# Patient Record
Sex: FEMALE | Race: WHITE | NOT HISPANIC OR LATINO
[De-identification: names, ages, dates, MRNs, and addresses within clinical notes are randomized per-mention and may not be internally consistent; named-entity substitution may affect disease eponyms.]

---

## 2020-07-21 ENCOUNTER — NON-APPOINTMENT (OUTPATIENT)
Age: 70
End: 2020-07-21

## 2020-07-21 ENCOUNTER — APPOINTMENT (OUTPATIENT)
Dept: CARDIOLOGY | Facility: CLINIC | Age: 70
End: 2020-07-21
Payer: MEDICARE

## 2020-07-21 VITALS
SYSTOLIC BLOOD PRESSURE: 159 MMHG | OXYGEN SATURATION: 96 % | HEIGHT: 61 IN | BODY MASS INDEX: 30.21 KG/M2 | HEART RATE: 75 BPM | WEIGHT: 160 LBS | DIASTOLIC BLOOD PRESSURE: 82 MMHG

## 2020-07-21 DIAGNOSIS — I65.29 OCCLUSION AND STENOSIS OF UNSPECIFIED CAROTID ARTERY: ICD-10-CM

## 2020-07-21 DIAGNOSIS — R03.0 ELEVATED BLOOD-PRESSURE READING, W/OUT DIAGNOSIS OF HYPERTENSION: ICD-10-CM

## 2020-07-21 DIAGNOSIS — R01.1 CARDIAC MURMUR, UNSPECIFIED: ICD-10-CM

## 2020-07-21 PROCEDURE — 93000 ELECTROCARDIOGRAM COMPLETE: CPT

## 2020-07-21 PROCEDURE — 99215 OFFICE O/P EST HI 40 MIN: CPT

## 2020-07-21 RX ORDER — PITAVASTATIN CALCIUM 4.18 MG/1
TABLET, FILM COATED ORAL
Refills: 0 | Status: ACTIVE | COMMUNITY

## 2020-07-21 RX ORDER — GLUCOSAMINE HCL 500 MG
TABLET ORAL
Refills: 0 | Status: ACTIVE | COMMUNITY

## 2020-08-17 ENCOUNTER — APPOINTMENT (OUTPATIENT)
Dept: CARDIOLOGY | Facility: CLINIC | Age: 70
End: 2020-08-17
Payer: MEDICARE

## 2020-08-17 PROCEDURE — 93790 AMBL BP MNTR W/SW I&R: CPT

## 2020-08-17 PROCEDURE — 93880 EXTRACRANIAL BILAT STUDY: CPT

## 2020-08-17 PROCEDURE — 93306 TTE W/DOPPLER COMPLETE: CPT

## 2020-09-01 ENCOUNTER — APPOINTMENT (OUTPATIENT)
Dept: CARDIOLOGY | Facility: CLINIC | Age: 70
End: 2020-09-01
Payer: MEDICARE

## 2020-09-01 PROCEDURE — 93015 CV STRESS TEST SUPVJ I&R: CPT

## 2020-09-14 ENCOUNTER — NON-APPOINTMENT (OUTPATIENT)
Age: 70
End: 2020-09-14

## 2020-09-14 ENCOUNTER — APPOINTMENT (OUTPATIENT)
Dept: CARDIOLOGY | Facility: CLINIC | Age: 70
End: 2020-09-14
Payer: MEDICARE

## 2020-09-14 VITALS
WEIGHT: 157 LBS | OXYGEN SATURATION: 98 % | DIASTOLIC BLOOD PRESSURE: 81 MMHG | BODY MASS INDEX: 29.64 KG/M2 | HEIGHT: 61 IN | SYSTOLIC BLOOD PRESSURE: 134 MMHG | HEART RATE: 68 BPM

## 2020-09-14 DIAGNOSIS — I34.0 NONRHEUMATIC MITRAL (VALVE) INSUFFICIENCY: ICD-10-CM

## 2020-09-14 DIAGNOSIS — I10 ESSENTIAL (PRIMARY) HYPERTENSION: ICD-10-CM

## 2020-09-14 DIAGNOSIS — R06.00 DYSPNEA, UNSPECIFIED: ICD-10-CM

## 2020-09-14 DIAGNOSIS — I35.1 NONRHEUMATIC AORTIC (VALVE) INSUFFICIENCY: ICD-10-CM

## 2020-09-14 PROCEDURE — 93000 ELECTROCARDIOGRAM COMPLETE: CPT

## 2020-09-14 PROCEDURE — 99215 OFFICE O/P EST HI 40 MIN: CPT

## 2020-10-19 ENCOUNTER — APPOINTMENT (OUTPATIENT)
Dept: CARDIOLOGY | Facility: CLINIC | Age: 70
End: 2020-10-19
Payer: MEDICARE

## 2020-10-19 PROCEDURE — 78452 HT MUSCLE IMAGE SPECT MULT: CPT

## 2020-10-19 PROCEDURE — 93015 CV STRESS TEST SUPVJ I&R: CPT

## 2020-10-19 PROCEDURE — A9500: CPT

## 2022-03-07 ENCOUNTER — APPOINTMENT (OUTPATIENT)
Dept: OTOLARYNGOLOGY | Facility: CLINIC | Age: 72
End: 2022-03-07
Payer: MEDICARE

## 2022-03-07 VITALS
DIASTOLIC BLOOD PRESSURE: 78 MMHG | HEART RATE: 84 BPM | HEIGHT: 61 IN | WEIGHT: 150 LBS | SYSTOLIC BLOOD PRESSURE: 157 MMHG | BODY MASS INDEX: 28.32 KG/M2

## 2022-03-07 DIAGNOSIS — K21.9 GASTRO-ESOPHAGEAL REFLUX DISEASE W/OUT ESOPHAGITIS: ICD-10-CM

## 2022-03-07 DIAGNOSIS — R13.12 DYSPHAGIA, OROPHARYNGEAL PHASE: ICD-10-CM

## 2022-03-07 DIAGNOSIS — R22.1 LOCALIZED SWELLING, MASS AND LUMP, NECK: ICD-10-CM

## 2022-03-07 PROCEDURE — 31575 DIAGNOSTIC LARYNGOSCOPY: CPT

## 2022-03-07 PROCEDURE — 99204 OFFICE O/P NEW MOD 45 MIN: CPT | Mod: 25

## 2022-03-07 RX ORDER — FLUCONAZOLE 150 MG/1
150 TABLET ORAL
Qty: 1 | Refills: 0 | Status: DISCONTINUED | COMMUNITY
Start: 2021-10-13

## 2022-03-07 RX ORDER — FLUTICASONE PROPIONATE 50 UG/1
50 SPRAY, METERED NASAL
Qty: 16 | Refills: 0 | Status: DISCONTINUED | COMMUNITY
Start: 2021-11-29

## 2022-03-07 RX ORDER — CIPROFLOXACIN HYDROCHLORIDE 250 MG/1
250 TABLET, FILM COATED ORAL
Qty: 10 | Refills: 0 | Status: DISCONTINUED | COMMUNITY
Start: 2021-10-09

## 2022-03-07 RX ORDER — CEPHALEXIN 500 MG/1
500 CAPSULE ORAL
Qty: 10 | Refills: 0 | Status: DISCONTINUED | COMMUNITY
Start: 2021-10-13

## 2022-03-07 NOTE — PHYSICAL EXAM
[] : septum deviated to the right [Normal] : mucosa is normal [Midline] : trachea located in midline position [de-identified] : prominent right sterno-clavicular joint

## 2022-03-07 NOTE — HISTORY OF PRESENT ILLNESS
[de-identified] : 71 yr old female feels a lump in her neck, noticed it in Jan.\par rare dysphagia w occ choking on water.  Much worse after dietary indiscretion\par daughter had thyroid CA, had surgery and is doing well\par stopped cigs 1975

## 2022-03-07 NOTE — REVIEW OF SYSTEMS
[Problem Snoring] : problem snoring [Snoring With Pauses] : snoring with pauses [Throat Dryness] : throat dryness [Throat Itching] : throat itching [Eyes Itch] : itching of the eyes [Palpitations] : palpitations [Heartburn] : heartburn [Negative] : Heme/Lymph [FreeTextEntry3] : watery eyes [FreeTextEntry6] : noisy breathing

## 2022-03-21 ENCOUNTER — APPOINTMENT (OUTPATIENT)
Dept: ULTRASOUND IMAGING | Facility: CLINIC | Age: 72
End: 2022-03-21
Payer: MEDICARE

## 2022-03-21 ENCOUNTER — OUTPATIENT (OUTPATIENT)
Dept: OUTPATIENT SERVICES | Facility: HOSPITAL | Age: 72
LOS: 1 days | End: 2022-03-21
Payer: MEDICARE

## 2022-03-21 DIAGNOSIS — R22.1 LOCALIZED SWELLING, MASS AND LUMP, NECK: ICD-10-CM

## 2022-03-21 PROCEDURE — 76536 US EXAM OF HEAD AND NECK: CPT

## 2022-03-21 PROCEDURE — 76536 US EXAM OF HEAD AND NECK: CPT | Mod: 26

## 2022-03-24 ENCOUNTER — RESULT REVIEW (OUTPATIENT)
Age: 72
End: 2022-03-24

## 2022-03-24 DIAGNOSIS — E04.1 NONTOXIC SINGLE THYROID NODULE: ICD-10-CM

## 2022-04-06 ENCOUNTER — RESULT REVIEW (OUTPATIENT)
Age: 72
End: 2022-04-06

## 2022-04-06 ENCOUNTER — OUTPATIENT (OUTPATIENT)
Dept: OUTPATIENT SERVICES | Facility: HOSPITAL | Age: 72
LOS: 1 days | End: 2022-04-06
Payer: MEDICARE

## 2022-04-06 ENCOUNTER — APPOINTMENT (OUTPATIENT)
Dept: ULTRASOUND IMAGING | Facility: CLINIC | Age: 72
End: 2022-04-06
Payer: MEDICARE

## 2022-04-06 DIAGNOSIS — E04.1 NONTOXIC SINGLE THYROID NODULE: ICD-10-CM

## 2022-04-06 PROCEDURE — 10005 FNA BX W/US GDN 1ST LES: CPT

## 2025-04-21 ENCOUNTER — OUTPATIENT (OUTPATIENT)
Dept: OUTPATIENT SERVICES | Facility: HOSPITAL | Age: 75
LOS: 1 days | End: 2025-04-21
Payer: MEDICARE

## 2025-04-21 VITALS
DIASTOLIC BLOOD PRESSURE: 79 MMHG | TEMPERATURE: 98 F | OXYGEN SATURATION: 100 % | HEIGHT: 59 IN | RESPIRATION RATE: 16 BRPM | HEART RATE: 88 BPM | WEIGHT: 147.93 LBS | SYSTOLIC BLOOD PRESSURE: 157 MMHG

## 2025-04-21 DIAGNOSIS — Z01.818 ENCOUNTER FOR OTHER PREPROCEDURAL EXAMINATION: ICD-10-CM

## 2025-04-21 DIAGNOSIS — K40.90 UNILATERAL INGUINAL HERNIA, WITHOUT OBSTRUCTION OR GANGRENE, NOT SPECIFIED AS RECURRENT: ICD-10-CM

## 2025-04-21 DIAGNOSIS — Z90.710 ACQUIRED ABSENCE OF BOTH CERVIX AND UTERUS: Chronic | ICD-10-CM

## 2025-04-21 DIAGNOSIS — Z98.891 HISTORY OF UTERINE SCAR FROM PREVIOUS SURGERY: Chronic | ICD-10-CM

## 2025-04-21 DIAGNOSIS — K42.9 UMBILICAL HERNIA WITHOUT OBSTRUCTION OR GANGRENE: ICD-10-CM

## 2025-04-21 DIAGNOSIS — Z98.890 OTHER SPECIFIED POSTPROCEDURAL STATES: Chronic | ICD-10-CM

## 2025-04-21 LAB
ALBUMIN SERPL ELPH-MCNC: 4.1 G/DL — SIGNIFICANT CHANGE UP (ref 3.3–5)
ALP SERPL-CCNC: 68 U/L — SIGNIFICANT CHANGE UP (ref 40–120)
ALT FLD-CCNC: 25 U/L — SIGNIFICANT CHANGE UP (ref 12–78)
ANION GAP SERPL CALC-SCNC: 7 MMOL/L — SIGNIFICANT CHANGE UP (ref 5–17)
AST SERPL-CCNC: 25 U/L — SIGNIFICANT CHANGE UP (ref 15–37)
BILIRUB SERPL-MCNC: 1 MG/DL — SIGNIFICANT CHANGE UP (ref 0.2–1.2)
BUN SERPL-MCNC: 23 MG/DL — SIGNIFICANT CHANGE UP (ref 7–23)
CALCIUM SERPL-MCNC: 9.9 MG/DL — SIGNIFICANT CHANGE UP (ref 8.5–10.1)
CHLORIDE SERPL-SCNC: 107 MMOL/L — SIGNIFICANT CHANGE UP (ref 96–108)
CO2 SERPL-SCNC: 25 MMOL/L — SIGNIFICANT CHANGE UP (ref 22–31)
CREAT SERPL-MCNC: 0.78 MG/DL — SIGNIFICANT CHANGE UP (ref 0.5–1.3)
EGFR: 80 ML/MIN/1.73M2 — SIGNIFICANT CHANGE UP
EGFR: 80 ML/MIN/1.73M2 — SIGNIFICANT CHANGE UP
GLUCOSE SERPL-MCNC: 96 MG/DL — SIGNIFICANT CHANGE UP (ref 70–99)
HCT VFR BLD CALC: 39.1 % — SIGNIFICANT CHANGE UP (ref 34.5–45)
HGB BLD-MCNC: 13.3 G/DL — SIGNIFICANT CHANGE UP (ref 11.5–15.5)
MCHC RBC-ENTMCNC: 29.2 PG — SIGNIFICANT CHANGE UP (ref 27–34)
MCHC RBC-ENTMCNC: 34 G/DL — SIGNIFICANT CHANGE UP (ref 32–36)
MCV RBC AUTO: 85.7 FL — SIGNIFICANT CHANGE UP (ref 80–100)
NRBC BLD AUTO-RTO: 0 /100 WBCS — SIGNIFICANT CHANGE UP (ref 0–0)
PLATELET # BLD AUTO: 260 K/UL — SIGNIFICANT CHANGE UP (ref 150–400)
POTASSIUM SERPL-MCNC: 3.9 MMOL/L — SIGNIFICANT CHANGE UP (ref 3.5–5.3)
POTASSIUM SERPL-SCNC: 3.9 MMOL/L — SIGNIFICANT CHANGE UP (ref 3.5–5.3)
PROT SERPL-MCNC: 8 G/DL — SIGNIFICANT CHANGE UP (ref 6–8.3)
RBC # BLD: 4.56 M/UL — SIGNIFICANT CHANGE UP (ref 3.8–5.2)
RBC # FLD: 13.5 % — SIGNIFICANT CHANGE UP (ref 10.3–14.5)
SODIUM SERPL-SCNC: 139 MMOL/L — SIGNIFICANT CHANGE UP (ref 135–145)
WBC # BLD: 7.01 K/UL — SIGNIFICANT CHANGE UP (ref 3.8–10.5)
WBC # FLD AUTO: 7.01 K/UL — SIGNIFICANT CHANGE UP (ref 3.8–10.5)

## 2025-04-21 PROCEDURE — G0463: CPT

## 2025-04-21 PROCEDURE — 86850 RBC ANTIBODY SCREEN: CPT

## 2025-04-21 PROCEDURE — 86900 BLOOD TYPING SEROLOGIC ABO: CPT

## 2025-04-21 PROCEDURE — 93010 ELECTROCARDIOGRAM REPORT: CPT

## 2025-04-21 PROCEDURE — 85027 COMPLETE CBC AUTOMATED: CPT

## 2025-04-21 PROCEDURE — 80053 COMPREHEN METABOLIC PANEL: CPT

## 2025-04-21 PROCEDURE — 36415 COLL VENOUS BLD VENIPUNCTURE: CPT

## 2025-04-21 PROCEDURE — 93005 ELECTROCARDIOGRAM TRACING: CPT

## 2025-04-21 PROCEDURE — 86901 BLOOD TYPING SEROLOGIC RH(D): CPT

## 2025-04-21 NOTE — H&P PST ADULT - NSICDXPROCEDURE_GEN_ALL_CORE_FT
PROCEDURES:  Laparoscopic initial repair of inguinal hernia 21-Apr-2025 14:51:00 LEFT poss right Alexsandra Rabago  Repair, hernia, nonreducible, recurrent, abdominal wall, anterior, less than 3 cm, with mesh insertion 21-Apr-2025 14:51:29  Robotic Surgical System Alexsandra Rabago

## 2025-04-21 NOTE — H&P PST ADULT - ASSESSMENT
- Educated on the importance of adhering to medical treatment plan  - Patient has a limited support system  - Social work consult for DC planning     Left Inguinal Hernia and Umbilical Hernia c/o pain for procedure

## 2025-04-21 NOTE — H&P PST ADULT - PROBLEM SELECTOR PLAN 1
Robotic Assisted Laparoscopic Left Inguinal Hernia - poss Right - Laparoscopic Recurrent Umbilical Hernia Repair by Dr. Tolbert on 5/1/2025.

## 2025-04-21 NOTE — H&P PST ADULT - HISTORY OF PRESENT ILLNESS
73 y/o female with PMH of HLD, HTN, Palpations, Aortic Insuffiency, CASSANDRA (no CPAP), GERD, Kidney Stones, Migraine with Visual Aura (wavy vision), Vocal Cord Cyst, Thyroid Nodule, for PST having Robotic Assisted Laparoscopic Left Inguinal Hernia - poss Right - Laparoscopic Recurrent Umbilical Hernia Repair by Dr. Tolbert on 5/1/2025.  She reports noticed a large abdominal lump, told was hernia, and was referred to surgeon by PCP.  Seen by provider, imaging revealed umbilical & left inguinal hernia and was recommended for the procedure.

## 2025-04-21 NOTE — H&P PST ADULT - PROBLEM SELECTOR PLAN 3
From: Jame Andrade  To: Jany Jordan MD  Sent: 2/23/2020 6:51 PM CST  Subject: After-Visit Question    Hi Dr. Jordan,   This is Jame Andrade, I just requested a refill on the Lexapro prescription that you first prescribed me. I am having a bad day today (feeling depressed and with anxiety) I was thinking that it might be best that I try the Lexapro again since it is for these symptoms I am currently having. I tried taking the buspar after I saw you from the last visit and had to stop due to not feeling good after trying it out. Can you please approve the Lexapro Im willing to try the lexapro again for two weeks and see if it works best for me. I had my wife throw out the first prescription because I didn’t want to accidentally take it. Sorry. I’m not getting good sleep and my ptsd is not getting any better. I even went to a a dispensary today got 2500 mg of CBD oil. I took 50 grams felt better for about an hour and a half then all symptoms came back. Please help me. I just want to feel normal again. Thanks   Labs CBC, CMP, T&S, and EKG   Medical and Cardiac clearances per surgeon  Pre op and Hibiclens instructions reviewed and given.   No meds to take am of surgery.   Instructed to hold and/or avoid other NSAIDs and OTC supplements. Tylenol is ok. Verbalized understanding

## 2025-04-21 NOTE — H&P PST ADULT - NSICDXPASTMEDICALHX_GEN_ALL_CORE_FT
PAST MEDICAL HISTORY:  Aortic insufficiency     GERD (gastroesophageal reflux disease)     Hay fever     Hyperlipidemia     Hypertension     Kidney stones     Migraine with visual aura     CASSANDRA (obstructive sleep apnea)     Palpitations     Thyroid nodule     Vocal cord cyst

## 2025-04-21 NOTE — H&P PST ADULT - NSICDXPASTSURGICALHX_GEN_ALL_CORE_FT
PAST SURGICAL HISTORY:  H/O colonoscopy     H/O endoscopy     H/O total hysterectomy     History of 2  sections     S/P appendectomy     S/P breast biopsy, bilateral benign    S/P dilatation and curettage x 2

## 2025-04-21 NOTE — H&P PST ADULT - NSICDXFAMILYHX_GEN_ALL_CORE_FT
FAMILY HISTORY:  Father  Still living? Unknown  FH: heart disease, Age at diagnosis: Age Unknown    Mother  Still living? Unknown  FH: heart disease, Age at diagnosis: Age Unknown  FH: liver cancer, Age at diagnosis: Age Unknown  FH: type 2 diabetes, Age at diagnosis: Age Unknown    Sibling  Still living? Unknown  FH: atrial fibrillation, Age at diagnosis: Age Unknown  FH: heart disease, Age at diagnosis: Age Unknown  Family history of valvular heart disease, Age at diagnosis: Age Unknown  FH: heart disease, Age at diagnosis: Age Unknown    Child  Still living? Unknown  Family history of cardiac pacemaker, Age at diagnosis: Age Unknown  FH: thyroid cancer, Age at diagnosis: Age Unknown    Grandparent  Still living? Unknown  FH: heart disease, Age at diagnosis: Age Unknown  FH: heart disease, Age at diagnosis: Age Unknown  FH: melanoma, Age at diagnosis: Age Unknown    
19

## 2025-04-30 NOTE — ASU PATIENT PROFILE, ADULT - TRANSFUSION HX COMMENT, PROFILE
Tumor Depth: Less than 6mm from granular layer and no invasion beyond the subcutaneous fat Pt. consents to blood transfusion if needed

## 2025-04-30 NOTE — ASU PATIENT PROFILE, ADULT - NSALCOHOLTYPE_GEN__A_CORE_SD
Verified Results  PAP TEST WITH HIGH RISK HPV [id number] 12:01AM [facility]     Test Name Result Flag Reference   PAP WITH HIGH RISK HPV (Report) O    Name: ALMA HINSON       MRN:   [id number]   : 1966           Visit#: [id number]                Gynecologic Cytology Consultation Report      Client:  [facility]      Date Specimen Collected: 17      Accession #: [id number]   Date Specimen Received: 17      Requisition   #:[id number]   Date Reported:      2017 11:40  Location:   093                * Addendum Present *   ______________________________________________________________________________   Cytologic Interpretation :      Negative for intraepithelial lesion or malignancy.       Satisfactory for evaluation. Absence of endocervical/transformation zone   component.         Vasquez Garcia, CT(ASCP)   ** Electronic Signature (BDL) 2017  11:40 **      Educational note: The Pap test is a screening test with a well-recognized   false negative rate. The best means available to lower the false negative   rate and to detect early cervical lesions is a Pap test at regular intervals.    All ThinPrep Paps will be reviewed with the aid of the ThinPrep Imaging   System, unless otherwise specified.      ______________________________________________________________________________   Clinical Information:   Menstrual Hx: Post Hysterectomy   Other Clinical Conditions:Pap source: Vaginal   REASON FOR COLLECTION::LOW RISK - ENCOUNTER FOR SCREENING FOR MALIGNANT   NEOPLASM OF CERVIX Z12.4   SOURCE::VAGINAL      Specimen(s) Submitted:    PAP with High Risk HPV      Procedures/Addenda:   HPV, High Risk_IL:   Date Ordered:   2017   Date Reported:2017      Interpretation   Aptimae HPV HIGH RISK (TYPES 16,18,31,33,35,39,45,51,52,56,58,59,66,68):   NEGATIVE      The APTIMA HPV Assay is an FDA approved in vitro nucleic acid amplification   test  for the qualitative detection of E6/E7 viral messenger RNA (mRNA) from 14   high-risk types of human papillomavirus (HPV) in cervical specimens. The   high-risk HPV types detected by the assay include: 16, 18, 31, 33, 35, 39, 45,   51, 52, 56, 58, 59, 66, and 68. The Aptima HPV Assay does not discriminate   between the 14 high-risk types. Cervical specimens in the Thin Prep Pap Test   vials containing PreservCyt Solution and collected with broom-type or   cytobrush/spatula collection devices may be tested with this assay using the   [employer]ringo System.       The APTIMA HPV Assay is intended to screen women 21 years and older with   atypical squamous cells of undetermined significance (ASC-US) cervical   cytology results to determine the need for referral to colposcopy. Test   results are not intended to prevent women from proceeding to colposcopy.       In women 30 years and older, the above assay can be used with cervical   cytology to adjunctively screen to assess the presence or absence of high-risk   HPV types. This information, with the physician's assessment of cytology   history, other risk factors, and guidelines, may be used to guide patient   management.                     ** Electronic Signature ** (MIGUEL A) 7/14/2017 12:52 **            ICD Codes:    Z12.72      Fee Codes:    A: [id number]    HPV_IL: [id number]      Performing Lab Location (Unless otherwise specified):   [address] [address]        [place]

## 2025-05-01 ENCOUNTER — TRANSCRIPTION ENCOUNTER (OUTPATIENT)
Age: 75
End: 2025-05-01

## 2025-05-01 ENCOUNTER — OUTPATIENT (OUTPATIENT)
Dept: OUTPATIENT SERVICES | Facility: HOSPITAL | Age: 75
LOS: 1 days | End: 2025-05-01
Payer: MEDICARE

## 2025-05-01 VITALS
HEIGHT: 60 IN | OXYGEN SATURATION: 97 % | DIASTOLIC BLOOD PRESSURE: 70 MMHG | RESPIRATION RATE: 14 BRPM | SYSTOLIC BLOOD PRESSURE: 142 MMHG | WEIGHT: 149.91 LBS | TEMPERATURE: 98 F | HEART RATE: 81 BPM

## 2025-05-01 VITALS
RESPIRATION RATE: 16 BRPM | DIASTOLIC BLOOD PRESSURE: 70 MMHG | SYSTOLIC BLOOD PRESSURE: 114 MMHG | TEMPERATURE: 98 F | OXYGEN SATURATION: 97 % | HEART RATE: 72 BPM

## 2025-05-01 DIAGNOSIS — Z01.818 ENCOUNTER FOR OTHER PREPROCEDURAL EXAMINATION: ICD-10-CM

## 2025-05-01 DIAGNOSIS — Z98.890 OTHER SPECIFIED POSTPROCEDURAL STATES: Chronic | ICD-10-CM

## 2025-05-01 DIAGNOSIS — Z90.710 ACQUIRED ABSENCE OF BOTH CERVIX AND UTERUS: Chronic | ICD-10-CM

## 2025-05-01 DIAGNOSIS — K42.9 UMBILICAL HERNIA WITHOUT OBSTRUCTION OR GANGRENE: ICD-10-CM

## 2025-05-01 DIAGNOSIS — Z98.891 HISTORY OF UTERINE SCAR FROM PREVIOUS SURGERY: Chronic | ICD-10-CM

## 2025-05-01 DIAGNOSIS — K40.90 UNILATERAL INGUINAL HERNIA, WITHOUT OBSTRUCTION OR GANGRENE, NOT SPECIFIED AS RECURRENT: ICD-10-CM

## 2025-05-01 LAB — ABO RH CONFIRMATION: SIGNIFICANT CHANGE UP

## 2025-05-01 PROCEDURE — 36415 COLL VENOUS BLD VENIPUNCTURE: CPT

## 2025-05-01 PROCEDURE — S2900: CPT

## 2025-05-01 PROCEDURE — 49616 RPR AA HRN RCR 3-10 NCR/STRN: CPT

## 2025-05-01 PROCEDURE — C1889: CPT

## 2025-05-01 PROCEDURE — C9399: CPT

## 2025-05-01 PROCEDURE — 49614 RPR AA HRN RCR < 3 NCR/STRN: CPT | Mod: AS

## 2025-05-01 PROCEDURE — 49650 LAP ING HERNIA REPAIR INIT: CPT | Mod: AS,LT

## 2025-05-01 PROCEDURE — 49650 LAP ING HERNIA REPAIR INIT: CPT

## 2025-05-01 PROCEDURE — C1781: CPT

## 2025-05-01 DEVICE — MESH HERNIA INGUINAL PARIETEX PROGRIP RECTANGLE 15 X 9CM: Type: IMPLANTABLE DEVICE | Site: LEFT | Status: FUNCTIONAL

## 2025-05-01 DEVICE — MESH VENTRALIGHT ST ECHO 4.5IN: Type: IMPLANTABLE DEVICE | Site: LEFT | Status: FUNCTIONAL

## 2025-05-01 DEVICE — SURGICEL SNOW 4 X 4": Type: IMPLANTABLE DEVICE | Site: LEFT | Status: FUNCTIONAL

## 2025-05-01 DEVICE — ETHICON HERNIA SECURESTRAP 5MM SIZE 25: Type: IMPLANTABLE DEVICE | Site: LEFT | Status: FUNCTIONAL

## 2025-05-01 RX ORDER — UBIDECARENONE 100 MG
1 CAPSULE ORAL
Refills: 0 | DISCHARGE

## 2025-05-01 RX ORDER — SODIUM CHLORIDE 9 G/1000ML
1000 INJECTION, SOLUTION INTRAVENOUS
Refills: 0 | Status: DISCONTINUED | OUTPATIENT
Start: 2025-05-01 | End: 2025-05-01

## 2025-05-01 RX ORDER — AMLODIPINE BESYLATE 10 MG/1
1 TABLET ORAL
Refills: 0 | DISCHARGE

## 2025-05-01 RX ORDER — IBUPROFEN 200 MG
1 TABLET ORAL
Qty: 20 | Refills: 0
Start: 2025-05-01

## 2025-05-01 RX ORDER — OXYCODONE HYDROCHLORIDE 30 MG/1
1 TABLET ORAL
Qty: 10 | Refills: 0
Start: 2025-05-01

## 2025-05-01 RX ORDER — ONDANSETRON HCL/PF 4 MG/2 ML
4 VIAL (ML) INJECTION ONCE
Refills: 0 | Status: DISCONTINUED | OUTPATIENT
Start: 2025-05-01 | End: 2025-05-01

## 2025-05-01 RX ORDER — ACETAMINOPHEN 500 MG/5ML
2 LIQUID (ML) ORAL
Qty: 20 | Refills: 0
Start: 2025-05-01

## 2025-05-01 RX ORDER — CEFAZOLIN SODIUM IN 0.9 % NACL 3 G/100 ML
2000 INTRAVENOUS SOLUTION, PIGGYBACK (ML) INTRAVENOUS ONCE
Refills: 0 | Status: COMPLETED | OUTPATIENT
Start: 2025-05-01 | End: 2025-05-01

## 2025-05-01 RX ORDER — PITAVASTATIN CALCIUM 4.18 MG/1
1 TABLET, FILM COATED ORAL
Refills: 0 | DISCHARGE

## 2025-05-01 RX ORDER — HYDROMORPHONE/SOD CHLOR,ISO/PF 2 MG/10 ML
0.5 SYRINGE (ML) INJECTION
Refills: 0 | Status: DISCONTINUED | OUTPATIENT
Start: 2025-05-01 | End: 2025-05-01

## 2025-05-01 RX ORDER — DOCUSATE SODIUM 100 MG
1 CAPSULE ORAL
Qty: 20 | Refills: 0
Start: 2025-05-01

## 2025-05-01 RX ADMIN — Medication 0.5 MILLIGRAM(S): at 12:04

## 2025-05-01 RX ADMIN — Medication 0.5 MILLIGRAM(S): at 12:14

## 2025-05-01 RX ADMIN — Medication 0.5 MILLIGRAM(S): at 12:24

## 2025-05-01 RX ADMIN — SODIUM CHLORIDE 75 MILLILITER(S): 9 INJECTION, SOLUTION INTRAVENOUS at 12:05

## 2025-05-01 RX ADMIN — Medication 0.5 MILLIGRAM(S): at 12:34

## 2025-05-01 NOTE — ASU DISCHARGE PLAN (ADULT/PEDIATRIC) - CARE PROVIDER_API CALL
Marcos Tolbert [facility]  [address], [address]  [place] [address]  Phone: [phone]  Fax: [phone]  Follow Up Time: 1 week

## 2025-05-01 NOTE — BRIEF OPERATIVE NOTE - OPERATION/FINDINGS
3.3 cm incarcerated recurrent umbilical hernia surrounded by large amount of adhesions, fat indirect inguinal hernia

## 2025-05-01 NOTE — ASU DISCHARGE PLAN (ADULT/PEDIATRIC) - ASU DC SPECIAL INSTRUCTIONSFT
You may shower in 24 hours.  Do not remove wound glue.  Do not let water hit wounds directly, do not rub incisions.      No heavy lifting (nothing heavier than 5 lbs.), no strenuous physical activity, no exercise.      Do not drive for 24 hours after anesthesia.  Do not drive while taking narcotic pain medications.  Do not drive until pain-free.      You may perform your usual daily tasks as tolerated.      Keep your legs elevated as much as possible (with your feet 18-20 inches above your heart) to help prevent pain/throbbing/swelling at surgical site.     Take Percocet for severe pain as prescribed.  Take Ibuprofen as prescribed for moderate pain (take with food).  Take colace as prescribed to prevent constipation.    Follow-up with Dr. Tolbert in his office next week - call office for appointment if not already made.

## 2025-05-01 NOTE — BRIEF OPERATIVE NOTE - NSICDXBRIEFPREOP_GEN_ALL_CORE_FT
PRE-OP DIAGNOSIS:  Left inguinal hernia 01-May-2025 11:44:42  Marcos Tolbert  Recurrent umbilical hernia with incarceration 01-May-2025 11:45:02  Marcos Tolbert

## 2025-05-01 NOTE — ASU DISCHARGE PLAN (ADULT/PEDIATRIC) - CALL YOUR DOCTOR IF YOU HAVE ANY OF THE FOLLOWING:
No Bleeding that does not stop/Swelling that gets worse/Pain not relieved by Medications/Fever greater than (need to indicate Fahrenheit or Celsius)/Wound/Surgical Site with redness, or foul smelling discharge or pus/Unable to urinate

## 2025-05-01 NOTE — BRIEF OPERATIVE NOTE - NSICDXBRIEFPROCEDURE_GEN_ALL_CORE_FT
PROCEDURES:  Robot-assisted repair of unilateral inguinal hernia with umbilical hernia repair 01-May-2025 11:44:31  Marcos Tolbert

## 2025-05-01 NOTE — ASU DISCHARGE PLAN (ADULT/PEDIATRIC) - FINANCIAL ASSISTANCE
[facility] provides services at a reduced cost to those who are determined to be eligible through [facility]’s financial assistance program. Information regarding [facility]’s financial assistance program can be found by going to [email or web address] or by calling [phone].

## 2025-05-01 NOTE — BRIEF OPERATIVE NOTE - NSICDXBRIEFPOSTOP_GEN_ALL_CORE_FT
POST-OP DIAGNOSIS:  Recurrent umbilical hernia with incarceration 01-May-2025 11:45:10  Marcos Tolbert  Left inguinal hernia 01-May-2025 11:44:52  Marcos Tolbert

## (undated) DEVICE — DRSG STERISTRIPS 0.5 X 4"

## (undated) DEVICE — XI ARM FORCEP FENESTRATED BIPOLAR 8MM

## (undated) DEVICE — D HELP - CLEARVIEW CLEARIFY SYSTEM

## (undated) DEVICE — FOLEY TRAY 16FR LF URINE METER SURESTEP

## (undated) DEVICE — ELCTR BOVIE TIP BLADE INSULATED 2.75" EDGE

## (undated) DEVICE — DRSG OPSITE 2.5 X 2"

## (undated) DEVICE — PLV-SCD MACHINE: Type: DURABLE MEDICAL EQUIPMENT

## (undated) DEVICE — TUBING STRYKER PNEUMOSURE HEATED RTP

## (undated) DEVICE — SUT MONOCRYL 4-0 27" PS-2 UNDYED

## (undated) DEVICE — XI ARM NEEDLE DRIVER SUTURECUT MEGA 8MM

## (undated) DEVICE — XI ARM SCISSOR MONO CURVED

## (undated) DEVICE — SOL IRR POUR H2O 1000ML

## (undated) DEVICE — XI SCISSOR TIP COVER

## (undated) DEVICE — XI CANNULA SEAL 5MM - 8 MM

## (undated) DEVICE — PLV/PSP-SUCTION IRRIGATOR STRYKER: Type: DURABLE MEDICAL EQUIPMENT

## (undated) DEVICE — DRAPE 3/4 SHEET W REINFORCEMENT 56X77"

## (undated) DEVICE — GLV 7.5 PROTEXIS (WHITE)

## (undated) DEVICE — SOL IRR POUR NS 0.9% 1000ML

## (undated) DEVICE — XI ENDOWRIST SUCTION IRRIGATOR 8MM

## (undated) DEVICE — PACK GENERAL LAPAROSCOPY

## (undated) DEVICE — SHEARS COVIDIEN ENDO SHEAR 5MM X 31CM W UNIPOLAR CAUTERY

## (undated) DEVICE — DISSECTOR ENDOSCOPIC KITTNER SINGLE TIP

## (undated) DEVICE — NDL HYPO SAFE 25G X 1.5" (ORANGE)

## (undated) DEVICE — SOL IRR BAG NS 0.9% 1000ML

## (undated) DEVICE — Device

## (undated) DEVICE — XI DRAPE ARM

## (undated) DEVICE — SUT VLOC 180 3-0 6" V-20 GREEN

## (undated) DEVICE — XI OBTURATOR OPTICAL BLADELESS 8MM

## (undated) DEVICE — XI CORD BIPOLAR CAUTERY (BLUE)

## (undated) DEVICE — XI CORD MONOPOLAR CAUTERY (GREEN)

## (undated) DEVICE — VENODYNE/SCD SLEEVE CALF LARGE

## (undated) DEVICE — LIGASURE MARYLAND 5MM X 37CM

## (undated) DEVICE — TROCAR APPLIED MEDICAL KII FIOS FIRST ENTRY 5MM X 100MM Z-THREAD

## (undated) DEVICE — DRSG DERMABOND 0.7ML

## (undated) DEVICE — XI DRAPE COLUMN

## (undated) DEVICE — PLV/PSP-ESU FORCE2 FIL 16736T: Type: DURABLE MEDICAL EQUIPMENT

## (undated) DEVICE — SUT VLOC 180 3-0 12" V-20 GREEN

## (undated) DEVICE — ELCTR BOVIE PENCIL SMOKE EVACUATION

## (undated) DEVICE — VENODYNE/SCD SLEEVE CALF MEDIUM

## (undated) DEVICE — GOWN SMARTGOWN RAGLAN XLG

## (undated) DEVICE — DRAPE TOWEL BLUE 17" X 24"

## (undated) DEVICE — WARMING BLANKET UPPER ADULT